# Patient Record
Sex: MALE | Race: WHITE
[De-identification: names, ages, dates, MRNs, and addresses within clinical notes are randomized per-mention and may not be internally consistent; named-entity substitution may affect disease eponyms.]

---

## 2022-02-18 ENCOUNTER — DASHBOARD ENCOUNTERS (OUTPATIENT)
Age: 42
End: 2022-02-18

## 2022-02-18 ENCOUNTER — LAB OUTSIDE AN ENCOUNTER (OUTPATIENT)
Dept: URBAN - METROPOLITAN AREA CLINIC 118 | Facility: CLINIC | Age: 42
End: 2022-02-18

## 2022-02-18 ENCOUNTER — WEB ENCOUNTER (OUTPATIENT)
Dept: URBAN - METROPOLITAN AREA CLINIC 118 | Facility: CLINIC | Age: 42
End: 2022-02-18

## 2022-02-18 ENCOUNTER — OFFICE VISIT (OUTPATIENT)
Dept: URBAN - METROPOLITAN AREA CLINIC 118 | Facility: CLINIC | Age: 42
End: 2022-02-18
Payer: COMMERCIAL

## 2022-02-18 DIAGNOSIS — R14.3 EXCESSIVE GAS: ICD-10-CM

## 2022-02-18 DIAGNOSIS — K58.1 IRRITABLE BOWEL SYNDROME WITH CONSTIPATION: ICD-10-CM

## 2022-02-18 DIAGNOSIS — R10.13 DYSPEPSIA: ICD-10-CM

## 2022-02-18 DIAGNOSIS — R14.0 ABDOMINAL BLOATING: ICD-10-CM

## 2022-02-18 PROCEDURE — 99204 OFFICE O/P NEW MOD 45 MIN: CPT | Performed by: INTERNAL MEDICINE

## 2022-02-18 RX ORDER — NORTRIPTYLINE HYDROCHLORIDE 25 MG/1
1 CAPSULE CAPSULE ORAL ONCE A DAY
Qty: 30 CAPSULE | Refills: 3 | OUTPATIENT
Start: 2022-02-18

## 2022-02-18 NOTE — HPI-TODAY'S VISIT:
This is a 42 yo male with pmh of GERD here for evaluation for multiple GI complaints.  Reports having years of constipation and main symptoms of bloating and gas. Symptoms are worse at night and keeps him up due to bloating and abdominal discomfort. He has to belch a lot and has abdominal discomfort after eating. He was evaluated initially at [facility] and had EGD in 2020 with Dr. Corea. It showed mild gastropathy, negative for HP. He was placed on protonix and carafate. He then saw Dr. Gutierrez with [facility] [place]. Colonoscopy in 10/2020 showed colitis. path showed mild to moderate active colitis but no features of chronicity on path. He was told he had ulcerative colitis and placed on steroids and mesalamine. He has been on linzess daily in the meantime for constipation. He also was on courses of empiric antibiotics with flagyl for SIBO. He most recently was evaluated at [place] and was told that he likely not have ulcerative colitis and was told to stop all medications for that.  He continues to have upset stomach and bloating. Feels he has sensitive stomach. Has been on low FODMAP diet. Feels frustrated that he cannot eat certain foods, which cause more abdominal discomfort and bloating.   Colonoscopy 10/12/20 Colitis.  Repeat in 2022. CT abdomen/pelvis 10/8/20- Unremarkable HIDA 9/30/20- Unremarkable exam. Ejection fraction of 83%. US abdomen 9/30/20 No cholelithiasis or other signs of acute cholecystitis.  Changes of hepatic steatosis. CT abdomen/pelvis 7/27/20- Unremarkable. Mild diverticular disease seen in the rectosigmoid colon.  EGD 6/25/20Dr. Corea- mild gastropathy, negative H. Pylori.

## 2022-02-22 PROBLEM — 440630006: Status: ACTIVE | Noted: 2022-02-18

## 2022-03-19 ENCOUNTER — ERX REFILL RESPONSE (OUTPATIENT)
Dept: URBAN - METROPOLITAN AREA CLINIC 118 | Facility: CLINIC | Age: 42
End: 2022-03-19

## 2022-03-19 RX ORDER — NORTRIPTYLINE HYDROCHLORIDE 25 MG/1
TAKE 1 CAPSULE BY MOUTH EVERY DAY FOR 30 DAYS CAPSULE ORAL
Qty: 30 CAPSULE | Refills: 4 | OUTPATIENT

## 2022-03-19 RX ORDER — NORTRIPTYLINE HYDROCHLORIDE 25 MG/1
1 CAPSULE CAPSULE ORAL ONCE A DAY
Qty: 30 CAPSULE | Refills: 3 | OUTPATIENT

## 2022-04-04 ENCOUNTER — OFFICE VISIT (OUTPATIENT)
Dept: URBAN - METROPOLITAN AREA CLINIC 118 | Facility: CLINIC | Age: 42
End: 2022-04-04